# Patient Record
Sex: FEMALE | Race: WHITE | NOT HISPANIC OR LATINO | Employment: FULL TIME | URBAN - METROPOLITAN AREA
[De-identification: names, ages, dates, MRNs, and addresses within clinical notes are randomized per-mention and may not be internally consistent; named-entity substitution may affect disease eponyms.]

---

## 2017-11-20 PROBLEM — C54.1 ENDOMETRIAL CANCER: Status: ACTIVE | Noted: 2017-11-20

## 2017-12-12 ENCOUNTER — LAB VISIT (OUTPATIENT)
Dept: LAB | Facility: HOSPITAL | Age: 52
End: 2017-12-12
Attending: RADIOLOGY
Payer: COMMERCIAL

## 2017-12-12 DIAGNOSIS — C54.1 ENDOMETRIAL SARCOMA: Primary | ICD-10-CM

## 2017-12-12 LAB
BILIRUB UR QL STRIP: NEGATIVE
CLARITY UR: CLEAR
COLOR UR: YELLOW
GLUCOSE UR QL STRIP: NEGATIVE
HGB UR QL STRIP: NEGATIVE
KETONES UR QL STRIP: NEGATIVE
LEUKOCYTE ESTERASE UR QL STRIP: NEGATIVE
NITRITE UR QL STRIP: NEGATIVE
PH UR STRIP: 5 [PH] (ref 5–8)
PROT UR QL STRIP: NEGATIVE
SP GR UR STRIP: 1.01 (ref 1–1.03)
URN SPEC COLLECT METH UR: NORMAL
UROBILINOGEN UR STRIP-ACNC: 0.2 EU/DL

## 2017-12-12 PROCEDURE — 87086 URINE CULTURE/COLONY COUNT: CPT | Mod: PN

## 2017-12-12 PROCEDURE — 81003 URINALYSIS AUTO W/O SCOPE: CPT

## 2017-12-12 PROCEDURE — 87086 URINE CULTURE/COLONY COUNT: CPT

## 2017-12-14 LAB — BACTERIA UR CULT: NO GROWTH

## 2018-02-08 ENCOUNTER — LAB VISIT (OUTPATIENT)
Dept: LAB | Facility: HOSPITAL | Age: 53
End: 2018-02-08
Attending: RADIOLOGY
Payer: COMMERCIAL

## 2018-02-08 DIAGNOSIS — C55 MALIGNANT NEOPLASM OF UTERUS, PART UNSPECIFIED: Primary | ICD-10-CM

## 2018-02-08 LAB
BACTERIA #/AREA URNS HPF: NEGATIVE /HPF
BILIRUB UR QL STRIP: NEGATIVE
CLARITY UR: CLEAR
COLOR UR: YELLOW
GLUCOSE UR QL STRIP: NEGATIVE
HGB UR QL STRIP: NEGATIVE
HYALINE CASTS #/AREA URNS LPF: 0 /LPF (ref 0–1)
KETONES UR QL STRIP: NEGATIVE
LEUKOCYTE ESTERASE UR QL STRIP: ABNORMAL
NITRITE UR QL STRIP: NEGATIVE
PH UR STRIP: 6 [PH] (ref 5–8)
PROT UR QL STRIP: NEGATIVE
RBC #/AREA URNS HPF: 1 /HPF (ref 0–4)
SP GR UR STRIP: 1 (ref 1–1.03)
SQUAMOUS #/AREA URNS HPF: 2 /HPF
URN SPEC COLLECT METH UR: ABNORMAL
UROBILINOGEN UR STRIP-ACNC: 0.2 EU/DL
WBC #/AREA URNS HPF: 16 /HPF (ref 0–5)

## 2018-02-08 PROCEDURE — 81001 URINALYSIS AUTO W/SCOPE: CPT

## 2018-02-08 PROCEDURE — 81001 URINALYSIS AUTO W/SCOPE: CPT | Mod: PN

## 2018-06-19 ENCOUNTER — LAB VISIT (OUTPATIENT)
Dept: LAB | Facility: HOSPITAL | Age: 53
End: 2018-06-19
Attending: OBSTETRICS & GYNECOLOGY
Payer: COMMERCIAL

## 2018-06-19 DIAGNOSIS — C54.1 ENDOMETRIAL SARCOMA: Primary | ICD-10-CM

## 2018-06-19 LAB
CANCER AG125 SERPL-ACNC: <5 U/ML
CEA SERPL-MCNC: <0.3 NG/ML

## 2018-06-19 PROCEDURE — 86304 IMMUNOASSAY TUMOR CA 125: CPT | Mod: PN

## 2018-06-19 PROCEDURE — 82378 CARCINOEMBRYONIC ANTIGEN: CPT | Mod: PN

## 2018-06-19 PROCEDURE — 36415 COLL VENOUS BLD VENIPUNCTURE: CPT | Mod: PN

## 2018-06-19 PROCEDURE — 82378 CARCINOEMBRYONIC ANTIGEN: CPT

## 2018-06-19 PROCEDURE — 86304 IMMUNOASSAY TUMOR CA 125: CPT

## 2018-10-01 ENCOUNTER — LAB VISIT (OUTPATIENT)
Dept: LAB | Facility: HOSPITAL | Age: 53
End: 2018-10-01
Attending: RADIOLOGY
Payer: COMMERCIAL

## 2018-10-01 DIAGNOSIS — C54.1 ENDOMETRIAL SARCOMA: ICD-10-CM

## 2018-10-01 DIAGNOSIS — C55 MALIGNANT NEOPLASM OF UTERUS, PART UNSPECIFIED: Primary | ICD-10-CM

## 2018-10-01 LAB
BUN SERPL-MCNC: 16 MG/DL
CANCER AG125 SERPL-ACNC: 7 U/ML
CEA SERPL-MCNC: <0.3 NG/ML
CREAT SERPL-MCNC: 0.89 MG/DL
EST. GFR  (AFRICAN AMERICAN): >60 ML/MIN/1.73 M^2
EST. GFR  (NON AFRICAN AMERICAN): >60 ML/MIN/1.73 M^2

## 2018-10-01 PROCEDURE — 86304 IMMUNOASSAY TUMOR CA 125: CPT

## 2018-10-01 PROCEDURE — 82378 CARCINOEMBRYONIC ANTIGEN: CPT

## 2018-10-01 PROCEDURE — 84520 ASSAY OF UREA NITROGEN: CPT | Mod: PN

## 2018-10-01 PROCEDURE — 82565 ASSAY OF CREATININE: CPT | Mod: PN

## 2018-10-01 PROCEDURE — 84520 ASSAY OF UREA NITROGEN: CPT

## 2018-10-01 PROCEDURE — 86304 IMMUNOASSAY TUMOR CA 125: CPT | Mod: PN

## 2018-10-01 PROCEDURE — 82378 CARCINOEMBRYONIC ANTIGEN: CPT | Mod: PN

## 2018-10-01 PROCEDURE — 82565 ASSAY OF CREATININE: CPT

## 2019-01-11 ENCOUNTER — LAB VISIT (OUTPATIENT)
Dept: LAB | Facility: HOSPITAL | Age: 54
End: 2019-01-11
Attending: OBSTETRICS & GYNECOLOGY
Payer: COMMERCIAL

## 2019-01-11 DIAGNOSIS — C54.1 ENDOMETRIAL SARCOMA: ICD-10-CM

## 2019-01-11 LAB
CANCER AG125 SERPL-ACNC: 6 U/ML
CEA SERPL-MCNC: <0.3 NG/ML

## 2019-01-11 PROCEDURE — 82378 CARCINOEMBRYONIC ANTIGEN: CPT | Mod: PN

## 2019-01-11 PROCEDURE — 86304 IMMUNOASSAY TUMOR CA 125: CPT

## 2019-01-11 PROCEDURE — 82378 CARCINOEMBRYONIC ANTIGEN: CPT

## 2019-01-11 PROCEDURE — 36415 COLL VENOUS BLD VENIPUNCTURE: CPT | Mod: PN

## 2019-01-11 PROCEDURE — 86304 IMMUNOASSAY TUMOR CA 125: CPT | Mod: PN

## 2019-05-13 ENCOUNTER — LAB VISIT (OUTPATIENT)
Dept: LAB | Facility: HOSPITAL | Age: 54
End: 2019-05-13
Attending: OBSTETRICS & GYNECOLOGY
Payer: COMMERCIAL

## 2019-05-13 DIAGNOSIS — C54.1 ENDOMETRIAL SARCOMA: Primary | ICD-10-CM

## 2019-05-13 LAB
CANCER AG125 SERPL-ACNC: <6 U/ML (ref 0–30)
CEA SERPL-MCNC: <0.3 NG/ML (ref 0–5)

## 2019-05-13 PROCEDURE — 86304 IMMUNOASSAY TUMOR CA 125: CPT

## 2019-05-13 PROCEDURE — 36415 COLL VENOUS BLD VENIPUNCTURE: CPT | Mod: PN

## 2019-05-13 PROCEDURE — 82378 CARCINOEMBRYONIC ANTIGEN: CPT | Mod: PN

## 2019-05-13 PROCEDURE — 82378 CARCINOEMBRYONIC ANTIGEN: CPT

## 2019-05-13 PROCEDURE — 86304 IMMUNOASSAY TUMOR CA 125: CPT | Mod: PN

## 2019-09-24 ENCOUNTER — LAB VISIT (OUTPATIENT)
Dept: LAB | Facility: HOSPITAL | Age: 54
End: 2019-09-24
Attending: OBSTETRICS & GYNECOLOGY
Payer: COMMERCIAL

## 2019-09-24 DIAGNOSIS — C54.1 ENDOMETRIAL SARCOMA: ICD-10-CM

## 2019-09-24 LAB
CANCER AG125 SERPL-ACNC: <6 U/ML (ref 0–30)
CEA SERPL-MCNC: 0.3 NG/ML (ref 0–5)

## 2019-09-24 PROCEDURE — 86304 IMMUNOASSAY TUMOR CA 125: CPT

## 2019-09-24 PROCEDURE — 82378 CARCINOEMBRYONIC ANTIGEN: CPT

## 2019-09-24 PROCEDURE — 86304 IMMUNOASSAY TUMOR CA 125: CPT | Mod: PN

## 2019-09-24 PROCEDURE — 82378 CARCINOEMBRYONIC ANTIGEN: CPT | Mod: PN

## 2019-09-24 PROCEDURE — 36415 COLL VENOUS BLD VENIPUNCTURE: CPT | Mod: PN

## 2020-02-04 ENCOUNTER — LAB VISIT (OUTPATIENT)
Dept: LAB | Facility: HOSPITAL | Age: 55
End: 2020-02-04
Attending: OBSTETRICS & GYNECOLOGY
Payer: COMMERCIAL

## 2020-02-04 DIAGNOSIS — C54.1 ENDOMETRIAL SARCOMA: ICD-10-CM

## 2020-02-04 LAB
CANCER AG125 SERPL-ACNC: <6 U/ML (ref 0–30)
CEA SERPL-MCNC: 0.4 NG/ML (ref 0–5)

## 2020-02-04 PROCEDURE — 82378 CARCINOEMBRYONIC ANTIGEN: CPT

## 2020-02-04 PROCEDURE — 86304 IMMUNOASSAY TUMOR CA 125: CPT | Mod: PN

## 2020-02-04 PROCEDURE — 82378 CARCINOEMBRYONIC ANTIGEN: CPT | Mod: PN

## 2020-02-04 PROCEDURE — 36415 COLL VENOUS BLD VENIPUNCTURE: CPT | Mod: PN

## 2020-02-04 PROCEDURE — 86304 IMMUNOASSAY TUMOR CA 125: CPT

## 2020-08-19 ENCOUNTER — LAB VISIT (OUTPATIENT)
Dept: LAB | Facility: HOSPITAL | Age: 55
End: 2020-08-19
Attending: OBSTETRICS & GYNECOLOGY
Payer: COMMERCIAL

## 2020-08-19 DIAGNOSIS — C54.1 ENDOMETRIAL SARCOMA: ICD-10-CM

## 2020-08-19 DIAGNOSIS — C54.1 ENDOMETRIAL SARCOMA: Primary | ICD-10-CM

## 2020-08-19 LAB
ALBUMIN SERPL BCP-MCNC: 4.5 G/DL (ref 3.5–5.2)
ALP SERPL-CCNC: 51 U/L (ref 38–145)
ALT SERPL W/O P-5'-P-CCNC: 21 U/L (ref 0–35)
ANION GAP SERPL CALC-SCNC: 6 MMOL/L (ref 8–16)
AST SERPL-CCNC: 26 U/L (ref 14–36)
BASOPHILS # BLD AUTO: 0.02 K/UL (ref 0–0.2)
BASOPHILS NFR BLD: 0.4 % (ref 0–1.9)
BILIRUB SERPL-MCNC: 0.6 MG/DL (ref 0.2–1.3)
BUN SERPL-MCNC: 15 MG/DL (ref 7–18)
CALCIUM SERPL-MCNC: 10 MG/DL (ref 8.4–10.2)
CANCER AG125 SERPL-ACNC: <6 U/ML (ref 0–30)
CEA SERPL-MCNC: <0.3 NG/ML (ref 0–5)
CHLORIDE SERPL-SCNC: 103 MMOL/L (ref 95–110)
CO2 SERPL-SCNC: 29 MMOL/L (ref 22–31)
CREAT SERPL-MCNC: 0.74 MG/DL (ref 0.5–1.4)
DIFFERENTIAL METHOD: NORMAL
EOSINOPHIL # BLD AUTO: 0 K/UL (ref 0–0.5)
EOSINOPHIL NFR BLD: 0 % (ref 0–8)
ERYTHROCYTE [DISTWIDTH] IN BLOOD BY AUTOMATED COUNT: 13.1 % (ref 11.5–14.5)
EST. GFR  (AFRICAN AMERICAN): >60 ML/MIN/1.73 M^2
EST. GFR  (NON AFRICAN AMERICAN): >60 ML/MIN/1.73 M^2
GLUCOSE SERPL-MCNC: 130 MG/DL (ref 70–110)
HCT VFR BLD AUTO: 41.9 % (ref 37–48.5)
HGB BLD-MCNC: 13.4 G/DL (ref 12–16)
IMM GRANULOCYTES # BLD AUTO: 0.01 K/UL (ref 0–0.04)
IMM GRANULOCYTES NFR BLD AUTO: 0.2 % (ref 0–0.5)
LYMPHOCYTES # BLD AUTO: 1.5 K/UL (ref 1–4.8)
LYMPHOCYTES NFR BLD: 27.3 % (ref 18–48)
MCH RBC QN AUTO: 29.9 PG (ref 27–31)
MCHC RBC AUTO-ENTMCNC: 32 G/DL (ref 32–36)
MCV RBC AUTO: 94 FL (ref 82–98)
MONOCYTES # BLD AUTO: 0.4 K/UL (ref 0.3–1)
MONOCYTES NFR BLD: 6.5 % (ref 4–15)
NEUTROPHILS # BLD AUTO: 3.6 K/UL (ref 1.8–7.7)
NEUTROPHILS NFR BLD: 65.6 % (ref 38–73)
NRBC BLD-RTO: 0 /100 WBC
PLATELET # BLD AUTO: 236 K/UL (ref 150–350)
PMV BLD AUTO: 11 FL (ref 9.2–12.9)
POTASSIUM SERPL-SCNC: 4.3 MMOL/L (ref 3.5–5.1)
PROT SERPL-MCNC: 7.5 G/DL (ref 6–8.4)
RBC # BLD AUTO: 4.48 M/UL (ref 4–5.4)
SODIUM SERPL-SCNC: 138 MMOL/L (ref 136–145)
WBC # BLD AUTO: 5.5 K/UL (ref 3.9–12.7)

## 2020-08-19 PROCEDURE — 82378 CARCINOEMBRYONIC ANTIGEN: CPT

## 2020-08-19 PROCEDURE — 80053 COMPREHEN METABOLIC PANEL: CPT

## 2020-08-19 PROCEDURE — 86304 IMMUNOASSAY TUMOR CA 125: CPT | Mod: PN

## 2020-08-19 PROCEDURE — 80053 COMPREHEN METABOLIC PANEL: CPT | Mod: PN

## 2020-08-19 PROCEDURE — 85025 COMPLETE CBC W/AUTO DIFF WBC: CPT

## 2020-08-19 PROCEDURE — 82378 CARCINOEMBRYONIC ANTIGEN: CPT | Mod: PN

## 2020-08-19 PROCEDURE — 86304 IMMUNOASSAY TUMOR CA 125: CPT

## 2020-08-19 PROCEDURE — 85025 COMPLETE CBC W/AUTO DIFF WBC: CPT | Mod: PN

## 2020-08-19 PROCEDURE — 36415 COLL VENOUS BLD VENIPUNCTURE: CPT | Mod: PN

## 2021-10-28 DIAGNOSIS — C54.1 ENDOMETRIAL SARCOMA: Primary | ICD-10-CM

## 2021-11-05 ENCOUNTER — LAB VISIT (OUTPATIENT)
Dept: LAB | Facility: HOSPITAL | Age: 56
End: 2021-11-05
Attending: OBSTETRICS & GYNECOLOGY
Payer: COMMERCIAL

## 2021-11-05 DIAGNOSIS — C54.1 ENDOMETRIAL SARCOMA: ICD-10-CM

## 2021-11-05 LAB
CANCER AG125 SERPL-ACNC: 11 U/ML (ref 0–30)
CEA SERPL-MCNC: <1.7 NG/ML (ref 0–5)

## 2021-11-05 PROCEDURE — 86304 IMMUNOASSAY TUMOR CA 125: CPT | Performed by: OBSTETRICS & GYNECOLOGY

## 2021-11-05 PROCEDURE — 82378 CARCINOEMBRYONIC ANTIGEN: CPT | Performed by: OBSTETRICS & GYNECOLOGY

## 2021-11-05 PROCEDURE — 36415 COLL VENOUS BLD VENIPUNCTURE: CPT | Mod: PO | Performed by: OBSTETRICS & GYNECOLOGY

## 2022-08-01 ENCOUNTER — LAB VISIT (OUTPATIENT)
Dept: LAB | Facility: HOSPITAL | Age: 57
End: 2022-08-01
Attending: OBSTETRICS & GYNECOLOGY
Payer: COMMERCIAL

## 2022-08-01 DIAGNOSIS — C54.1 ENDOMETRIAL SARCOMA: Primary | ICD-10-CM

## 2022-08-01 LAB
CANCER AG125 SERPL-ACNC: 11 U/ML (ref 0–30)
CEA SERPL-MCNC: <1.7 NG/ML (ref 0–5)

## 2022-08-01 PROCEDURE — 86304 IMMUNOASSAY TUMOR CA 125: CPT | Performed by: OBSTETRICS & GYNECOLOGY

## 2022-08-01 PROCEDURE — 36415 COLL VENOUS BLD VENIPUNCTURE: CPT | Mod: PO | Performed by: OBSTETRICS & GYNECOLOGY

## 2022-08-01 PROCEDURE — 82378 CARCINOEMBRYONIC ANTIGEN: CPT | Performed by: OBSTETRICS & GYNECOLOGY

## 2023-02-16 ENCOUNTER — LAB VISIT (OUTPATIENT)
Dept: LAB | Facility: HOSPITAL | Age: 58
End: 2023-02-16
Attending: OBSTETRICS & GYNECOLOGY
Payer: COMMERCIAL

## 2023-02-16 DIAGNOSIS — Z85.42 PERSONAL HISTORY OF MALIGNANT NEOPLASM OF OTHER PARTS OF UTERUS: Primary | ICD-10-CM

## 2023-02-16 PROCEDURE — 86304 IMMUNOASSAY TUMOR CA 125: CPT | Performed by: OBSTETRICS & GYNECOLOGY

## 2023-02-16 PROCEDURE — 36415 COLL VENOUS BLD VENIPUNCTURE: CPT | Mod: PO | Performed by: OBSTETRICS & GYNECOLOGY

## 2023-02-16 PROCEDURE — 82378 CARCINOEMBRYONIC ANTIGEN: CPT | Performed by: OBSTETRICS & GYNECOLOGY

## 2023-02-17 LAB
CANCER AG125 SERPL-ACNC: 13 U/ML (ref 0–30)
CEA SERPL-MCNC: <1.7 NG/ML (ref 0–5)

## 2024-02-20 ENCOUNTER — LAB VISIT (OUTPATIENT)
Dept: LAB | Facility: HOSPITAL | Age: 59
End: 2024-02-20
Payer: COMMERCIAL

## 2024-02-20 DIAGNOSIS — Z85.42 PERSONAL HISTORY OF MALIGNANT NEOPLASM OF OTHER PARTS OF UTERUS: ICD-10-CM

## 2024-02-20 LAB
CANCER AG125 SERPL-ACNC: 11 U/ML (ref 0–30)
CEA SERPL-MCNC: 1.7 NG/ML (ref 0–5)

## 2024-02-20 PROCEDURE — 82378 CARCINOEMBRYONIC ANTIGEN: CPT | Performed by: OBSTETRICS & GYNECOLOGY

## 2024-02-20 PROCEDURE — 36415 COLL VENOUS BLD VENIPUNCTURE: CPT | Mod: PO | Performed by: OBSTETRICS & GYNECOLOGY

## 2024-02-20 PROCEDURE — 86304 IMMUNOASSAY TUMOR CA 125: CPT | Performed by: OBSTETRICS & GYNECOLOGY

## 2024-06-05 ENCOUNTER — LAB VISIT (OUTPATIENT)
Dept: LAB | Facility: HOSPITAL | Age: 59
End: 2024-06-05
Payer: COMMERCIAL

## 2024-06-05 DIAGNOSIS — Z85.42 PERSONAL HISTORY OF MALIGNANT NEOPLASM OF OTHER PARTS OF UTERUS: Primary | ICD-10-CM

## 2024-06-05 LAB
CANCER AG125 SERPL-ACNC: 11 U/ML (ref 0–30)
CEA SERPL-MCNC: <1.7 NG/ML (ref 0–5)

## 2024-06-05 PROCEDURE — 36415 COLL VENOUS BLD VENIPUNCTURE: CPT | Mod: PO | Performed by: OBSTETRICS & GYNECOLOGY

## 2024-06-05 PROCEDURE — 82378 CARCINOEMBRYONIC ANTIGEN: CPT | Performed by: OBSTETRICS & GYNECOLOGY

## 2024-06-05 PROCEDURE — 86304 IMMUNOASSAY TUMOR CA 125: CPT | Performed by: OBSTETRICS & GYNECOLOGY

## 2025-01-15 ENCOUNTER — TELEPHONE (OUTPATIENT)
Dept: HEMATOLOGY/ONCOLOGY | Facility: CLINIC | Age: 60
End: 2025-01-15
Payer: COMMERCIAL

## 2025-01-15 ENCOUNTER — LAB VISIT (OUTPATIENT)
Dept: LAB | Facility: HOSPITAL | Age: 60
End: 2025-01-15
Attending: OBSTETRICS & GYNECOLOGY
Payer: COMMERCIAL

## 2025-01-15 DIAGNOSIS — Z85.42 PERSONAL HISTORY OF MALIGNANT NEOPLASM OF OTHER PARTS OF UTERUS: Primary | ICD-10-CM

## 2025-01-15 DIAGNOSIS — Z85.42 PERSONAL HISTORY OF MALIGNANT NEOPLASM OF OTHER PARTS OF UTERUS: ICD-10-CM

## 2025-01-15 LAB
CANCER AG125 SERPL-ACNC: 19 U/ML (ref 0–30)
CEA SERPL-MCNC: 1.7 NG/ML (ref 0–5)

## 2025-01-15 PROCEDURE — 82378 CARCINOEMBRYONIC ANTIGEN: CPT | Performed by: OBSTETRICS & GYNECOLOGY

## 2025-01-15 PROCEDURE — 36415 COLL VENOUS BLD VENIPUNCTURE: CPT | Mod: PO | Performed by: OBSTETRICS & GYNECOLOGY

## 2025-01-15 PROCEDURE — 86304 IMMUNOASSAY TUMOR CA 125: CPT | Performed by: OBSTETRICS & GYNECOLOGY

## 2025-01-15 NOTE — TELEPHONE ENCOUNTER
----- Message from Donna sent at 1/15/2025 10:19 AM CST -----  Regarding: orders  Contact: patient  Type: Needs Medical Advice  Who Called:  patient  Symptoms (please be specific):    How long has patient had these symptoms:    Pharmacy name and phone #:    Best Call Back Number: [phone] (home)     Additional Information: patient states she needs lab orders sent in.  Please call patient to advise.  Thanks!

## 2025-01-31 ENCOUNTER — LAB VISIT (OUTPATIENT)
Dept: LAB | Facility: HOSPITAL | Age: 60
End: 2025-01-31
Attending: RADIOLOGY
Payer: COMMERCIAL

## 2025-01-31 DIAGNOSIS — C55 MALIGNANT NEOPLASM OF UTERUS, PART UNSPECIFIED: ICD-10-CM

## 2025-01-31 LAB
BUN SERPL-MCNC: 16 MG/DL (ref 8–23)
CREAT SERPL-MCNC: 0.8 MG/DL (ref 0.5–1.4)
EST. GFR  (NO RACE VARIABLE): >60 ML/MIN/1.73 M^2

## 2025-01-31 PROCEDURE — 82565 ASSAY OF CREATININE: CPT | Performed by: RADIOLOGY

## 2025-01-31 PROCEDURE — 84520 ASSAY OF UREA NITROGEN: CPT | Performed by: RADIOLOGY

## 2025-01-31 PROCEDURE — 36415 COLL VENOUS BLD VENIPUNCTURE: CPT | Mod: PO | Performed by: RADIOLOGY
